# Patient Record
Sex: FEMALE | Race: OTHER | URBAN - METROPOLITAN AREA
[De-identification: names, ages, dates, MRNs, and addresses within clinical notes are randomized per-mention and may not be internally consistent; named-entity substitution may affect disease eponyms.]

---

## 2022-02-21 ENCOUNTER — OUT OF OFFICE VISIT (OUTPATIENT)
Dept: URBAN - METROPOLITAN AREA MEDICAL CENTER 25 | Facility: MEDICAL CENTER | Age: 67
End: 2022-02-21
Payer: COMMERCIAL

## 2022-02-21 DIAGNOSIS — R74.8 ABNORMAL ALKALINE PHOSPHATASE TEST: ICD-10-CM

## 2022-02-21 PROCEDURE — 99222 1ST HOSP IP/OBS MODERATE 55: CPT | Performed by: INTERNAL MEDICINE

## 2022-02-21 PROCEDURE — 99232 SBSQ HOSP IP/OBS MODERATE 35: CPT | Performed by: INTERNAL MEDICINE

## 2022-02-21 PROCEDURE — G8427 DOCREV CUR MEDS BY ELIG CLIN: HCPCS | Performed by: INTERNAL MEDICINE

## 2022-02-22 ENCOUNTER — LAB OUTSIDE AN ENCOUNTER (OUTPATIENT)
Dept: URBAN - METROPOLITAN AREA CLINIC 19 | Facility: CLINIC | Age: 67
End: 2022-02-22

## 2022-03-15 ENCOUNTER — OFFICE VISIT (OUTPATIENT)
Dept: URBAN - METROPOLITAN AREA CLINIC 19 | Facility: CLINIC | Age: 67
End: 2022-03-15
Payer: COMMERCIAL

## 2022-03-15 ENCOUNTER — LAB OUTSIDE AN ENCOUNTER (OUTPATIENT)
Dept: URBAN - METROPOLITAN AREA CLINIC 19 | Facility: CLINIC | Age: 67
End: 2022-03-15

## 2022-03-15 ENCOUNTER — WEB ENCOUNTER (OUTPATIENT)
Dept: URBAN - METROPOLITAN AREA CLINIC 19 | Facility: CLINIC | Age: 67
End: 2022-03-15

## 2022-03-15 DIAGNOSIS — R79.89 ELEVATED LFTS: ICD-10-CM

## 2022-03-15 DIAGNOSIS — Z12.11 COLON CANCER SCREENING: ICD-10-CM

## 2022-03-15 PROCEDURE — 99213 OFFICE O/P EST LOW 20 MIN: CPT | Performed by: NURSE PRACTITIONER

## 2022-03-15 RX ORDER — POLYETHYLENE GLYCOL 3350 17 G/17G
1 PACKET MIXED WITH 8 OUNCES OF FLUID POWDER, FOR SOLUTION ORAL ONCE A DAY
Status: DISCONTINUED | COMMUNITY

## 2022-03-15 RX ORDER — CHOLECALCIFEROL (VITAMIN D3) 125 MCG
1 TABLET CAPSULE ORAL ONCE A DAY
Status: DISCONTINUED | COMMUNITY

## 2022-03-15 RX ORDER — APIXABAN 5 MG/1
AS DIRECTED TABLET, FILM COATED ORAL
Status: ACTIVE | COMMUNITY

## 2022-03-15 RX ORDER — CALCIUM NO.38/D3/MAG/BORON ASP 500MG/15ML
AS DIRECTED LIQUID (ML) ORAL
Status: DISCONTINUED | COMMUNITY

## 2022-03-15 RX ORDER — MONTELUKAST SODIUM 10 MG/1
1 TABLET TABLET, FILM COATED ORAL ONCE A DAY
Status: ACTIVE | COMMUNITY

## 2022-03-15 RX ORDER — AMOXICILLIN AND CLAVULANATE POTASSIUM 250; 125 MG/1; MG/1
1 TABLET TABLET, FILM COATED ORAL
Status: DISCONTINUED | COMMUNITY

## 2022-03-15 NOTE — HPI-TODAY'S VISIT:
Ms. Thayer is a 67-year-old female who is following up today from recent hospital admission.  She presented to [facility] on 2/18/2022 for fever, dry cough, fatigue, and dyspnea.  She was found to have bilateral PEs.  Labs-WBC 3.1, Hgb 14.1, HCT 43.0, MCV 90.5, bilirubin 1.7, alk phos 166, AST 85, .  CT ab/pelvis-no definitive acute focal inflammatory pathology seen in the abdomen or pelvis.  Status post cholecystectomy with no fluid collections or masses seen at the cholecystectomy bed.  Left renal cyst.  Right renal angiomyolipoma.  Probable hepatic angiomas is above technically indeterminate.  Degenerative changes in the spine.  Nonvisualization of the uterus.  Large volume of stool in the colon.  Patient had had a cholecystectomy with Dr. Ward on 2/7/2022.  Patient had an MRCP 2/22/2022- no explanation for elevated LFTs.  Mild prominence of the extrahepatic bile duct is within normal limits given the cholecystectomy status.  No choledocholithiasis.  No gross obstructing mass given the unenhanced exam.  Angiomyolipoma of the right kidney and lobulated, large simple cyst in the left kidney.  No suspicious renal mass.  No renal imaging follow-up necessary. 2/21/2022 intrinsic liver panel completed- Ceruloplasmin 45.7, alpha-1 antitrypsin , ASMA negative, hepatitis nonreactive, AMA less than 0.1, YESENIA negative. Today, she reports she is feeling well.  No nausea or abdominal pain.  She reports her bilirubin has been elevated in the past.  She reports she passed a gallstone in the 1980s and was jaundiced at that time.  No family history of liver disease.  She reports she  has not had any alcohol since being put on Eliquis.  She reports she previously was drinking a glass of wine a couple of times a week.  Never been a heavy drinker.  No family history of liver disease.  She reports her last colonoscopy was in 2010 with Dr. Diaz.  No polyps.  No family history of colon cancer.  Follow-up in 10 years.  Patient reports she is not had any blood work since her hospital discharge. No cardiac/kidney disease, diabetes, or home O2. Seen by [facility].

## 2022-03-16 LAB
A/G RATIO: 1.5
ALBUMIN: 4.1
ALKALINE PHOSPHATASE: 115
ALT (SGPT): 13
AST (SGOT): 15
BILIRUBIN, TOTAL: 0.8
BUN/CREATININE RATIO: 18
BUN: 13
CALCIUM: 9.8
CARBON DIOXIDE, TOTAL: 22
CHLORIDE: 106
CREATININE: 0.72
EGFR: 93
GLOBULIN, TOTAL: 2.7
GLUCOSE: 91
HEMATOCRIT: 43
HEMOGLOBIN: 13.4
INR: 0.9
MCH: 29.5
MCHC: 31.2
MCV: 95
NRBC: (no result)
PLATELETS: 197
POTASSIUM: 4.3
PROTEIN, TOTAL: 6.8
PROTHROMBIN TIME: 10
RBC: 4.54
RDW: 13
SODIUM: 144
WBC: 5.1

## 2022-03-22 ENCOUNTER — TELEPHONE ENCOUNTER (OUTPATIENT)
Dept: URBAN - METROPOLITAN AREA CLINIC 19 | Facility: CLINIC | Age: 67
End: 2022-03-22

## 2023-03-09 ENCOUNTER — OFFICE VISIT (OUTPATIENT)
Dept: URBAN - METROPOLITAN AREA SURGERY CENTER 31 | Facility: SURGERY CENTER | Age: 68
End: 2023-03-09

## 2023-05-03 ENCOUNTER — OFFICE VISIT (OUTPATIENT)
Dept: URBAN - METROPOLITAN AREA CLINIC 19 | Facility: CLINIC | Age: 68
End: 2023-05-03
Payer: COMMERCIAL

## 2023-05-03 ENCOUNTER — LAB OUTSIDE AN ENCOUNTER (OUTPATIENT)
Dept: URBAN - METROPOLITAN AREA CLINIC 19 | Facility: CLINIC | Age: 68
End: 2023-05-03

## 2023-05-03 VITALS
BODY MASS INDEX: 29.16 KG/M2 | OXYGEN SATURATION: 98 % | WEIGHT: 175 LBS | HEIGHT: 65 IN | DIASTOLIC BLOOD PRESSURE: 80 MMHG | HEART RATE: 66 BPM | SYSTOLIC BLOOD PRESSURE: 130 MMHG | TEMPERATURE: 97.5 F

## 2023-05-03 DIAGNOSIS — R10.816 EPIGASTRIC ABDOMINAL TENDERNESS, REBOUND TENDERNESS PRESENCE NOT SPECIFIED: ICD-10-CM

## 2023-05-03 DIAGNOSIS — K30 INDIGESTION: ICD-10-CM

## 2023-05-03 DIAGNOSIS — Z12.11 COLON CANCER SCREENING: ICD-10-CM

## 2023-05-03 PROBLEM — 162031009: Status: ACTIVE | Noted: 2023-05-03

## 2023-05-03 PROCEDURE — 99204 OFFICE O/P NEW MOD 45 MIN: CPT | Performed by: NURSE PRACTITIONER

## 2023-05-03 RX ORDER — MONTELUKAST SODIUM 10 MG/1
1 TABLET TABLET, FILM COATED ORAL ONCE A DAY
Status: ACTIVE | COMMUNITY

## 2023-05-03 RX ORDER — ROSUVASTATIN CALCIUM 10 MG/1
1 TABLET TABLET, FILM COATED ORAL ONCE A DAY
Status: ACTIVE | COMMUNITY

## 2023-05-03 NOTE — HPI-TODAY'S VISIT:
Ms. Thayer is a 69 yo female with PMH of asthma, arthritis, cholecystectomy in 2/2022 with subsequent PE completed 3 months of Eliquis who presents today for screening colooscopy and upper GI complaints.  She reports ever since she had her gallbladder removed 2/7/2022 she feels she has been having more indigestion and never had that before. Reports intermittent epigastric tenderness. No excessive belching. Denies reflux. Does not take anything for it. Not everyday, on occasion about every few weeks lasting a few days. No trouble swallowing. No unintentional weight loss. Denies trouble swallowing.  BMs are different since gallbladder out #4-5. But she has a BM everyday. No bloody or black stools. No lower abdominal or rectal pain. But states known hemorrhoids.   Denies family history of colon cancer or colon polyps.  She is not on any blood thinners. Takes Ibuprofen only on occasion. No aspirin. No supplemental O2. No pacemaker or stents. No chronic kidney disease.  Last colonoscopy was 12/6/2010 by Dr. Diaz and was normal, recommend repeat in 10 years.

## 2023-05-04 ENCOUNTER — OFFICE VISIT (OUTPATIENT)
Dept: URBAN - METROPOLITAN AREA SURGERY CENTER 31 | Facility: SURGERY CENTER | Age: 68
End: 2023-05-04

## 2023-05-11 ENCOUNTER — OFFICE VISIT (OUTPATIENT)
Dept: URBAN - METROPOLITAN AREA CLINIC 19 | Facility: CLINIC | Age: 68
End: 2023-05-11

## 2023-06-22 ENCOUNTER — WEB ENCOUNTER (OUTPATIENT)
Dept: URBAN - METROPOLITAN AREA SURGERY CENTER 31 | Facility: SURGERY CENTER | Age: 68
End: 2023-06-22

## 2023-06-27 ENCOUNTER — OFFICE VISIT (OUTPATIENT)
Dept: URBAN - METROPOLITAN AREA SURGERY CENTER 31 | Facility: SURGERY CENTER | Age: 68
End: 2023-06-27
Payer: COMMERCIAL

## 2023-06-27 ENCOUNTER — CLAIMS CREATED FROM THE CLAIM WINDOW (OUTPATIENT)
Dept: URBAN - METROPOLITAN AREA CLINIC 4 | Facility: CLINIC | Age: 68
End: 2023-06-27
Payer: COMMERCIAL

## 2023-06-27 DIAGNOSIS — D12.4 POLYP OF DESCENDING COLON: ICD-10-CM

## 2023-06-27 DIAGNOSIS — K29.60 OTHER GASTRITIS WITHOUT BLEEDING: ICD-10-CM

## 2023-06-27 DIAGNOSIS — B96.81 BACTERIAL INFECTION DUE TO H. PYLORI: ICD-10-CM

## 2023-06-27 DIAGNOSIS — K22.89 OTHER SPECIFIED DISEASE OF ESOPHAGUS: ICD-10-CM

## 2023-06-27 DIAGNOSIS — K20.80 OTHER ESOPHAGITIS: ICD-10-CM

## 2023-06-27 DIAGNOSIS — R10.13 ABDOMINAL PAIN, EPIGASTRIC: ICD-10-CM

## 2023-06-27 DIAGNOSIS — Z12.11 COLON CANCER SCREENING: ICD-10-CM

## 2023-06-27 DIAGNOSIS — K20.0 EOSINOPHILIC ESOPHAGITIS: ICD-10-CM

## 2023-06-27 DIAGNOSIS — K31.89 OTHER DISEASES OF STOMACH AND DUODENUM: ICD-10-CM

## 2023-06-27 PROCEDURE — 43239 EGD BIOPSY SINGLE/MULTIPLE: CPT | Performed by: INTERNAL MEDICINE

## 2023-06-27 PROCEDURE — G8907 PT DOC NO EVENTS ON DISCHARG: HCPCS | Performed by: INTERNAL MEDICINE

## 2023-06-27 PROCEDURE — 88305 TISSUE EXAM BY PATHOLOGIST: CPT | Performed by: PATHOLOGY

## 2023-06-27 PROCEDURE — 88342 IMHCHEM/IMCYTCHM 1ST ANTB: CPT | Performed by: PATHOLOGY

## 2023-06-27 PROCEDURE — 45385 COLONOSCOPY W/LESION REMOVAL: CPT | Performed by: INTERNAL MEDICINE

## 2023-06-27 PROCEDURE — 88312 SPECIAL STAINS GROUP 1: CPT | Performed by: PATHOLOGY

## 2023-06-27 PROCEDURE — 45380 COLONOSCOPY AND BIOPSY: CPT | Performed by: INTERNAL MEDICINE

## 2023-06-27 RX ORDER — ROSUVASTATIN CALCIUM 10 MG/1
1 TABLET TABLET, FILM COATED ORAL ONCE A DAY
Status: ACTIVE | COMMUNITY

## 2023-06-27 RX ORDER — MONTELUKAST SODIUM 10 MG/1
1 TABLET TABLET, FILM COATED ORAL ONCE A DAY
Status: ACTIVE | COMMUNITY

## 2023-07-21 ENCOUNTER — OFFICE VISIT (OUTPATIENT)
Dept: URBAN - METROPOLITAN AREA CLINIC 19 | Facility: CLINIC | Age: 68
End: 2023-07-21
Payer: COMMERCIAL

## 2023-07-21 ENCOUNTER — WEB ENCOUNTER (OUTPATIENT)
Dept: URBAN - METROPOLITAN AREA CLINIC 19 | Facility: CLINIC | Age: 68
End: 2023-07-21

## 2023-07-21 VITALS
WEIGHT: 173.4 LBS | SYSTOLIC BLOOD PRESSURE: 132 MMHG | OXYGEN SATURATION: 97 % | HEART RATE: 66 BPM | BODY MASS INDEX: 28.89 KG/M2 | DIASTOLIC BLOOD PRESSURE: 80 MMHG | TEMPERATURE: 98.4 F | HEIGHT: 65 IN

## 2023-07-21 DIAGNOSIS — A04.8 BACTERIAL INFECTION DUE TO H. PYLORI: ICD-10-CM

## 2023-07-21 DIAGNOSIS — K20.0 EOSINOPHILIC ESOPHAGITIS: ICD-10-CM

## 2023-07-21 DIAGNOSIS — Z86.010 PERSONAL HISTORY OF COLONIC POLYPS: ICD-10-CM

## 2023-07-21 PROBLEM — 428283002: Status: ACTIVE | Noted: 2023-07-21

## 2023-07-21 PROBLEM — 235599003: Status: ACTIVE | Noted: 2023-07-21

## 2023-07-21 PROCEDURE — 99214 OFFICE O/P EST MOD 30 MIN: CPT | Performed by: NURSE PRACTITIONER

## 2023-07-21 RX ORDER — BISMUTH SUBSALICYLATE 262 MG
2 TABLETS WITH MEALS AND AT BEDTIME TABLET,CHEWABLE ORAL
Qty: 112 TABLET | Refills: 0 | OUTPATIENT
Start: 2023-07-21 | End: 2023-08-04

## 2023-07-21 RX ORDER — MONTELUKAST SODIUM 10 MG/1
1 TABLET TABLET, FILM COATED ORAL ONCE A DAY
Status: ACTIVE | COMMUNITY

## 2023-07-21 RX ORDER — METRONIDAZOLE 250 MG/1
1 TABLET TABLET ORAL
Qty: 56 TABLET | Refills: 0 | OUTPATIENT
Start: 2023-07-21 | End: 2023-08-04

## 2023-07-21 RX ORDER — ROSUVASTATIN CALCIUM 10 MG/1
1 TABLET TABLET, FILM COATED ORAL ONCE A DAY
Status: ACTIVE | COMMUNITY

## 2023-07-21 RX ORDER — PANTOPRAZOLE SODIUM 20 MG/1
1 TABLET TABLET, DELAYED RELEASE ORAL TWICE A DAY
Qty: 28 TABLET | Refills: 0 | OUTPATIENT
Start: 2023-07-21

## 2023-07-21 RX ORDER — DOXYCYCLINE HYCLATE 100 MG/1
1 TABLET TABLET, COATED ORAL TWICE A DAY
Qty: 28 TABLET | Refills: 0 | OUTPATIENT
Start: 2023-07-21 | End: 2023-08-04

## 2023-07-21 NOTE — HPI-TODAY'S VISIT:
Ms. Thayer is a 68-year-old female with PMH of asthma, arthritis, cholecystectomy in 2/7/2022 with subsequent PE completed 3 months of Eliquis who presents today for procedure follow-up.   EGD/colonoscopy was done by Dr. Lopez on 6/27/2023. EGD- mild esophagitis in the middle and lower third esophagus.  Erythematous mucosa in the antrum.  Normal duodenum. Path: Duodenum-negative.  Stomach antrum biopsy-chronic Helicobacter gastritis positive for H. pylori no evidence of intestinal metaplasia.   Esophagus middle and lower third-basal cell hyperplasia and borderline increased intra epithelial eosinophils 20/hpf. Colonoscopy - two 5 to 6 mm polyps in the descending colon, one 4 mm polyp in the descending colon, internal hemorrhoids.  Path: tubular adenomas.  3-year follow-up given.  Colonoscopy 12/6/2010 by Dr. Cortes was normal,

## 2023-07-26 ENCOUNTER — OFFICE VISIT (OUTPATIENT)
Dept: URBAN - METROPOLITAN AREA TELEHEALTH 2 | Facility: TELEHEALTH | Age: 68
End: 2023-07-26
Payer: COMMERCIAL

## 2023-07-26 VITALS — HEIGHT: 65 IN | BODY MASS INDEX: 28.82 KG/M2 | WEIGHT: 173 LBS

## 2023-07-26 DIAGNOSIS — K20.0 EOSINOPHILIC ESOPHAGITIS: ICD-10-CM

## 2023-07-26 PROCEDURE — 97802 MEDICAL NUTRITION INDIV IN: CPT | Performed by: DIETITIAN, REGISTERED

## 2023-07-26 RX ORDER — ROSUVASTATIN CALCIUM 10 MG/1
1 TABLET TABLET, FILM COATED ORAL ONCE A DAY
Status: ACTIVE | COMMUNITY

## 2023-07-26 RX ORDER — DOXYCYCLINE HYCLATE 100 MG/1
1 TABLET TABLET, COATED ORAL TWICE A DAY
Qty: 28 TABLET | Refills: 0 | Status: ACTIVE | COMMUNITY
Start: 2023-07-21 | End: 2023-08-04

## 2023-07-26 RX ORDER — BISMUTH SUBSALICYLATE 262 MG
2 TABLETS WITH MEALS AND AT BEDTIME TABLET,CHEWABLE ORAL
Qty: 112 TABLET | Refills: 0 | Status: ACTIVE | COMMUNITY
Start: 2023-07-21 | End: 2023-08-04

## 2023-07-26 RX ORDER — PANTOPRAZOLE SODIUM 20 MG/1
1 TABLET TABLET, DELAYED RELEASE ORAL TWICE A DAY
Qty: 28 TABLET | Refills: 0 | Status: ACTIVE | COMMUNITY
Start: 2023-07-21

## 2023-07-26 RX ORDER — METRONIDAZOLE 250 MG/1
1 TABLET TABLET ORAL
Qty: 56 TABLET | Refills: 0 | Status: ACTIVE | COMMUNITY
Start: 2023-07-21 | End: 2023-08-04

## 2023-07-26 RX ORDER — MONTELUKAST SODIUM 10 MG/1
1 TABLET TABLET, FILM COATED ORAL ONCE A DAY
Status: ACTIVE | COMMUNITY

## 2023-07-31 ENCOUNTER — OFFICE VISIT (OUTPATIENT)
Dept: URBAN - METROPOLITAN AREA CLINIC 19 | Facility: CLINIC | Age: 68
End: 2023-07-31

## 2023-08-25 ENCOUNTER — TELEPHONE ENCOUNTER (OUTPATIENT)
Dept: URBAN - METROPOLITAN AREA CLINIC 19 | Facility: CLINIC | Age: 68
End: 2023-08-25

## 2023-09-01 ENCOUNTER — TELEPHONE ENCOUNTER (OUTPATIENT)
Dept: URBAN - METROPOLITAN AREA CLINIC 19 | Facility: CLINIC | Age: 68
End: 2023-09-01

## 2023-09-08 ENCOUNTER — TELEPHONE ENCOUNTER (OUTPATIENT)
Dept: URBAN - METROPOLITAN AREA CLINIC 19 | Facility: CLINIC | Age: 68
End: 2023-09-08

## 2023-09-08 RX ORDER — ROSUVASTATIN CALCIUM 10 MG/1
1 TABLET TABLET, FILM COATED ORAL ONCE A DAY
Status: ACTIVE | COMMUNITY

## 2023-09-08 RX ORDER — VANCOMYCIN HYDROCHLORIDE 125 MG/1
1 CAPSULE CAPSULE ORAL
Qty: 56 CAPSULE | Refills: 0 | OUTPATIENT
Start: 2023-09-08 | End: 2023-09-22

## 2023-09-08 RX ORDER — MONTELUKAST SODIUM 10 MG/1
1 TABLET TABLET, FILM COATED ORAL ONCE A DAY
Status: ACTIVE | COMMUNITY

## 2023-09-08 RX ORDER — PANTOPRAZOLE SODIUM 20 MG/1
1 TABLET TABLET, DELAYED RELEASE ORAL TWICE A DAY
Qty: 28 TABLET | Refills: 0 | Status: ACTIVE | COMMUNITY
Start: 2023-07-21

## 2023-10-08 ENCOUNTER — WEB ENCOUNTER (OUTPATIENT)
Dept: URBAN - METROPOLITAN AREA CLINIC 19 | Facility: CLINIC | Age: 68
End: 2023-10-08

## 2023-10-08 ENCOUNTER — WEB ENCOUNTER (OUTPATIENT)
Dept: URBAN - METROPOLITAN AREA CLINIC 128 | Facility: CLINIC | Age: 68
End: 2023-10-08

## 2023-10-16 ENCOUNTER — TELEPHONE ENCOUNTER (OUTPATIENT)
Dept: URBAN - METROPOLITAN AREA CLINIC 19 | Facility: CLINIC | Age: 68
End: 2023-10-16

## 2023-12-20 ENCOUNTER — OFFICE VISIT (OUTPATIENT)
Dept: URBAN - METROPOLITAN AREA CLINIC 19 | Facility: CLINIC | Age: 68
End: 2023-12-20

## 2023-12-20 RX ORDER — MONTELUKAST SODIUM 10 MG/1
1 TABLET TABLET, FILM COATED ORAL ONCE A DAY
Status: ACTIVE | COMMUNITY

## 2023-12-20 RX ORDER — PANTOPRAZOLE SODIUM 20 MG/1
1 TABLET TABLET, DELAYED RELEASE ORAL TWICE A DAY
Qty: 28 TABLET | Refills: 0 | Status: ACTIVE | COMMUNITY
Start: 2023-07-21

## 2023-12-20 RX ORDER — ROSUVASTATIN CALCIUM 10 MG/1
1 TABLET TABLET, FILM COATED ORAL ONCE A DAY
Status: ACTIVE | COMMUNITY

## 2024-02-15 ENCOUNTER — OV EP (OUTPATIENT)
Dept: URBAN - METROPOLITAN AREA CLINIC 19 | Facility: CLINIC | Age: 69
End: 2024-02-15
Payer: COMMERCIAL

## 2024-02-15 VITALS
HEART RATE: 75 BPM | OXYGEN SATURATION: 98 % | SYSTOLIC BLOOD PRESSURE: 130 MMHG | BODY MASS INDEX: 26.33 KG/M2 | DIASTOLIC BLOOD PRESSURE: 80 MMHG | HEIGHT: 65 IN | TEMPERATURE: 97.4 F | WEIGHT: 158 LBS

## 2024-02-15 DIAGNOSIS — A04.72 C. DIFFICILE DIARRHEA: ICD-10-CM

## 2024-02-15 DIAGNOSIS — K20.0 EOSINOPHILIC ESOPHAGITIS: ICD-10-CM

## 2024-02-15 DIAGNOSIS — A04.8 OTHER SPECIFIED BACTERIAL INTESTINAL INFECTIONS: ICD-10-CM

## 2024-02-15 PROCEDURE — 99213 OFFICE O/P EST LOW 20 MIN: CPT | Performed by: NURSE PRACTITIONER

## 2024-02-15 RX ORDER — ROSUVASTATIN CALCIUM 10 MG/1
1 TABLET TABLET, FILM COATED ORAL ONCE A DAY
Status: ACTIVE | COMMUNITY

## 2024-02-15 RX ORDER — MONTELUKAST SODIUM 10 MG/1
1 TABLET TABLET, FILM COATED ORAL ONCE A DAY
Status: ACTIVE | COMMUNITY

## 2024-02-15 NOTE — HPI-TODAY'S VISIT:
Ms. Thayer is a 68-year-old female with PMH of asthma, arthritis, cholecystectomy in 2/7/2022 with subsequent PE completed 3 months of Eliquis who presents today for follow-up.  She was last seen in GI clinic 7/21/2023.  She was treated with quadruple therapy for H. pylori 9/6/2023 stool studies were positive for C. difficile.  Vancomycin 2-week course completed  She has been working with dietician on food elimination diet She is taking a probiotic She reports pain and nausea is much better. Has cut out gluten and dairy, has lost some weight and symptoms much improved including joint pain. She is very pleased with this progress. No significant heartburn or reflux symptoms.    Prior history------------------------         EGD/colonoscopy was done by Dr. Lopez on 6/27/2023.        EGD- mild esophagitis in the middle and lower third esophagus. Erythematous mucosa in the antrum. Normal duodenum.        Path: Duodenum-negative. Stomach antrum biopsy-chronic Helicobacter gastritis positive for H. pylori no evidence of intestinal metaplasia.        Esophagus middle and lower third-basal cell hyperplasia and borderline increased intra epithelial eosinophils 20/hpf.        Colonoscopy - two 5 to 6 mm polyps in the descending colon, one 4 mm polyp in the descending colon, internal hemorrhoids. Path: tubular adenomas. 3-year follow-up given.        Colonoscopy 12/6/2010 by Dr. Cortes was normal.

## 2024-05-15 ENCOUNTER — WEB ENCOUNTER (OUTPATIENT)
Dept: URBAN - METROPOLITAN AREA CLINIC 19 | Facility: CLINIC | Age: 69
End: 2024-05-15

## 2024-05-18 ENCOUNTER — WEB ENCOUNTER (OUTPATIENT)
Dept: URBAN - METROPOLITAN AREA CLINIC 19 | Facility: CLINIC | Age: 69
End: 2024-05-18

## 2024-08-29 ENCOUNTER — DASHBOARD ENCOUNTERS (OUTPATIENT)
Age: 69
End: 2024-08-29

## 2024-08-29 ENCOUNTER — OFFICE VISIT (OUTPATIENT)
Dept: URBAN - METROPOLITAN AREA CLINIC 19 | Facility: CLINIC | Age: 69
End: 2024-08-29
Payer: COMMERCIAL

## 2024-08-29 ENCOUNTER — LAB OUTSIDE AN ENCOUNTER (OUTPATIENT)
Dept: URBAN - METROPOLITAN AREA CLINIC 19 | Facility: CLINIC | Age: 69
End: 2024-08-29

## 2024-08-29 VITALS
HEART RATE: 66 BPM | DIASTOLIC BLOOD PRESSURE: 76 MMHG | SYSTOLIC BLOOD PRESSURE: 136 MMHG | BODY MASS INDEX: 28.92 KG/M2 | TEMPERATURE: 98 F | WEIGHT: 173.6 LBS | HEIGHT: 65 IN | OXYGEN SATURATION: 99 %

## 2024-08-29 DIAGNOSIS — K20.0 EOSINOPHILIC ESOPHAGITIS: ICD-10-CM

## 2024-08-29 PROCEDURE — 99213 OFFICE O/P EST LOW 20 MIN: CPT | Performed by: INTERNAL MEDICINE

## 2024-08-29 RX ORDER — MONTELUKAST SODIUM 10 MG/1
1 TABLET TABLET, FILM COATED ORAL ONCE A DAY
Status: ACTIVE | COMMUNITY

## 2024-08-29 RX ORDER — ROSUVASTATIN CALCIUM 10 MG/1
1 TABLET TABLET, FILM COATED ORAL ONCE A DAY
Status: ACTIVE | COMMUNITY

## 2024-08-29 NOTE — HPI-TODAY'S VISIT:
8/29/24: Patient presents to me as a referral from Dr. Larissa Palumbo (ENT/Allergist) for a reevaluation of her eosinophilic esophagitis. Chart reviewed. The patient has never had dysphagia despite the 20 eos/hpf seen on proximal and distal esophageal biopsies on 6/27/23; her main complaint is dyspepsia (bloating/indigestion) after eating certain foods. She had cut out gluten, dairy, and nuts from her diet and wanted to reintroduce these foods. Gluten always seems to cause discomfort, but she is able to tolerate some dairy. She has no issues with nuts. She wanted confirmation that she could liberalize her diet to include nuts and small amounts of dairy while avoiding gluten. She also wanted to know if she should have another EGD to reevaluate her esophageal biopsies for EoE.  2/15/24 (Radha Ojeda NP): Ms. Thayer is a 68-year-old female with PMH of asthma, arthritis, cholecystectomy in 2/7/2022 with subsequent PE completed 3 months of Eliquis who presents today for follow-up.  She was last seen in GI clinic 7/21/2023.  She was treated with quadruple therapy for H. pylori 9/6/2023 stool studies were positive for C. difficile.  Vancomycin 2-week course completed  She has been working with dietician on food elimination diet She is taking a probiotic She reports pain and nausea is much better. Has cut out gluten and dairy, has lost some weight and symptoms much improved including joint pain. She is very pleased with this progress. No significant heartburn or reflux symptoms.    Prior history- - - - - - - - - - - -          EGD/colonoscopy was done by Dr. Lopez on 6/27/2023.        EGD- mild esophagitis in the middle and lower third esophagus. Erythematous mucosa in the antrum. Normal duodenum.        Path: Duodenum-negative. Stomach antrum biopsy-chronic Helicobacter gastritis positive for H. pylori no evidence of intestinal metaplasia.        Esophagus middle and lower third-basal cell hyperplasia and borderline increased intra epithelial eosinophils 20/hpf.        Colonoscopy - two 5 to 6 mm polyps in the descending colon, one 4 mm polyp in the descending colon, internal hemorrhoids. Path: tubular adenomas. 3-year follow-up given.        Colonoscopy 12/6/2010 by Dr. Cortes was normal.

## 2024-08-30 ENCOUNTER — TELEPHONE ENCOUNTER (OUTPATIENT)
Dept: URBAN - METROPOLITAN AREA CLINIC 19 | Facility: CLINIC | Age: 69
End: 2024-08-30

## 2024-09-09 ENCOUNTER — WEB ENCOUNTER (OUTPATIENT)
Dept: URBAN - METROPOLITAN AREA CLINIC 19 | Facility: CLINIC | Age: 69
End: 2024-09-09

## 2024-09-16 ENCOUNTER — OFFICE VISIT (OUTPATIENT)
Dept: URBAN - METROPOLITAN AREA SURGERY CENTER 31 | Facility: SURGERY CENTER | Age: 69
End: 2024-09-16